# Patient Record
Sex: FEMALE | Race: WHITE | Employment: FULL TIME | URBAN - METROPOLITAN AREA
[De-identification: names, ages, dates, MRNs, and addresses within clinical notes are randomized per-mention and may not be internally consistent; named-entity substitution may affect disease eponyms.]

---

## 2020-06-02 ENCOUNTER — HOSPITAL ENCOUNTER (INPATIENT)
Age: 45
LOS: 2 days | Discharge: HOME | DRG: 272 | End: 2020-06-04
Attending: GENERAL PRACTICE | Admitting: GENERAL PRACTICE
Payer: COMMERCIAL

## 2020-06-02 PROBLEM — I82.402 ACUTE DEEP VEIN THROMBOSIS (DVT) OF LEFT LOWER EXTREMITY (HCC): Status: ACTIVE | Noted: 2020-06-02

## 2020-06-02 LAB
APTT: 25.1 SECONDS (ref 25.1–37.1)
HCT VFR BLD CALC: 37.1 % (ref 37–47)
HEMOGLOBIN: 11.4 GM/DL (ref 12.5–16)
HOMOCYSTEINE: 9.6 UMOL/L (ref 0–10)
MCH RBC QN AUTO: 28.4 PG (ref 27–31)
MCHC RBC AUTO-ENTMCNC: 30.7 % (ref 32–36)
MCV RBC AUTO: 92.3 FL (ref 78–100)
PDW BLD-RTO: 15.5 % (ref 11.7–14.9)
PLATELET # BLD: 431 K/CU MM (ref 140–440)
PMV BLD AUTO: 8.4 FL (ref 7.5–11.1)
RBC # BLD: 4.02 M/CU MM (ref 4.2–5.4)
WBC # BLD: 9.9 K/CU MM (ref 4–10.5)

## 2020-06-02 PROCEDURE — 81240 F2 GENE: CPT

## 2020-06-02 PROCEDURE — 85240 CLOT FACTOR VIII AHG 1 STAGE: CPT

## 2020-06-02 PROCEDURE — 85300 ANTITHROMBIN III ACTIVITY: CPT

## 2020-06-02 PROCEDURE — 81291 MTHFR GENE: CPT

## 2020-06-02 PROCEDURE — 86147 CARDIOLIPIN ANTIBODY EA IG: CPT

## 2020-06-02 PROCEDURE — 86148 ANTI-PHOSPHOLIPID ANTIBODY: CPT

## 2020-06-02 PROCEDURE — 85303 CLOT INHIBIT PROT C ACTIVITY: CPT

## 2020-06-02 PROCEDURE — 85027 COMPLETE CBC AUTOMATED: CPT

## 2020-06-02 PROCEDURE — 85730 THROMBOPLASTIN TIME PARTIAL: CPT

## 2020-06-02 PROCEDURE — 6360000002 HC RX W HCPCS: Performed by: GENERAL PRACTICE

## 2020-06-02 PROCEDURE — 1200000000 HC SEMI PRIVATE

## 2020-06-02 PROCEDURE — 83090 ASSAY OF HOMOCYSTEINE: CPT

## 2020-06-02 PROCEDURE — 81241 F5 GENE: CPT

## 2020-06-02 PROCEDURE — 36415 COLL VENOUS BLD VENIPUNCTURE: CPT

## 2020-06-02 PROCEDURE — 2580000003 HC RX 258: Performed by: GENERAL PRACTICE

## 2020-06-02 PROCEDURE — 85305 CLOT INHIBIT PROT S TOTAL: CPT

## 2020-06-02 RX ORDER — LORATADINE 10 MG/1
10 CAPSULE, LIQUID FILLED ORAL DAILY
COMMUNITY

## 2020-06-02 RX ORDER — HEPARIN SODIUM 1000 [USP'U]/ML
10000 INJECTION, SOLUTION INTRAVENOUS; SUBCUTANEOUS PRN
Status: DISCONTINUED | OUTPATIENT
Start: 2020-06-03 | End: 2020-06-03

## 2020-06-02 RX ORDER — LEVOTHYROXINE SODIUM 0.1 MG/1
200 TABLET ORAL DAILY
COMMUNITY

## 2020-06-02 RX ORDER — SODIUM CHLORIDE 0.9 % (FLUSH) 0.9 %
10 SYRINGE (ML) INJECTION EVERY 12 HOURS SCHEDULED
Status: DISCONTINUED | OUTPATIENT
Start: 2020-06-02 | End: 2020-06-04 | Stop reason: HOSPADM

## 2020-06-02 RX ORDER — HEPARIN SODIUM 1000 [USP'U]/ML
10000 INJECTION, SOLUTION INTRAVENOUS; SUBCUTANEOUS ONCE
Status: COMPLETED | OUTPATIENT
Start: 2020-06-02 | End: 2020-06-02

## 2020-06-02 RX ORDER — PROMETHAZINE HYDROCHLORIDE 25 MG/1
12.5 TABLET ORAL EVERY 6 HOURS PRN
Status: DISCONTINUED | OUTPATIENT
Start: 2020-06-02 | End: 2020-06-04 | Stop reason: HOSPADM

## 2020-06-02 RX ORDER — HEPARIN SODIUM 10000 [USP'U]/100ML
16.5 INJECTION, SOLUTION INTRAVENOUS CONTINUOUS
Status: DISCONTINUED | OUTPATIENT
Start: 2020-06-02 | End: 2020-06-02

## 2020-06-02 RX ORDER — CHOLECALCIFEROL (VITAMIN D3) 125 MCG
500 CAPSULE ORAL EVERY OTHER DAY
COMMUNITY

## 2020-06-02 RX ORDER — SODIUM CHLORIDE 0.9 % (FLUSH) 0.9 %
10 SYRINGE (ML) INJECTION PRN
Status: DISCONTINUED | OUTPATIENT
Start: 2020-06-02 | End: 2020-06-04 | Stop reason: HOSPADM

## 2020-06-02 RX ORDER — POLYETHYLENE GLYCOL 3350 17 G/17G
17 POWDER, FOR SOLUTION ORAL DAILY PRN
Status: DISCONTINUED | OUTPATIENT
Start: 2020-06-02 | End: 2020-06-04 | Stop reason: HOSPADM

## 2020-06-02 RX ORDER — HEPARIN SODIUM 10000 [USP'U]/100ML
18 INJECTION, SOLUTION INTRAVENOUS CONTINUOUS
Status: DISCONTINUED | OUTPATIENT
Start: 2020-06-02 | End: 2020-06-03

## 2020-06-02 RX ORDER — ONDANSETRON 2 MG/ML
4 INJECTION INTRAMUSCULAR; INTRAVENOUS EVERY 6 HOURS PRN
Status: DISCONTINUED | OUTPATIENT
Start: 2020-06-02 | End: 2020-06-04 | Stop reason: HOSPADM

## 2020-06-02 RX ORDER — ACETAMINOPHEN 325 MG/1
650 TABLET ORAL EVERY 6 HOURS PRN
Status: DISCONTINUED | OUTPATIENT
Start: 2020-06-02 | End: 2020-06-04 | Stop reason: HOSPADM

## 2020-06-02 RX ORDER — ACETAMINOPHEN 650 MG/1
650 SUPPOSITORY RECTAL EVERY 6 HOURS PRN
Status: DISCONTINUED | OUTPATIENT
Start: 2020-06-02 | End: 2020-06-04 | Stop reason: HOSPADM

## 2020-06-02 RX ORDER — NICOTINE POLACRILEX 2 MG
1 GUM BUCCAL DAILY
COMMUNITY

## 2020-06-02 RX ORDER — HEPARIN SODIUM 1000 [USP'U]/ML
5000 INJECTION, SOLUTION INTRAVENOUS; SUBCUTANEOUS PRN
Status: DISCONTINUED | OUTPATIENT
Start: 2020-06-02 | End: 2020-06-03

## 2020-06-02 RX ORDER — LEVOTHYROXINE SODIUM 0.1 MG/1
200 TABLET ORAL DAILY
Status: DISCONTINUED | OUTPATIENT
Start: 2020-06-03 | End: 2020-06-04 | Stop reason: HOSPADM

## 2020-06-02 RX ADMIN — HEPARIN SODIUM AND DEXTROSE 18 UNITS/KG/HR: 10000; 5 INJECTION INTRAVENOUS at 20:08

## 2020-06-02 RX ADMIN — SODIUM CHLORIDE, PRESERVATIVE FREE 10 ML: 5 INJECTION INTRAVENOUS at 20:09

## 2020-06-02 RX ADMIN — HEPARIN SODIUM 10000 UNITS: 1000 INJECTION INTRAVENOUS; SUBCUTANEOUS at 20:09

## 2020-06-02 ASSESSMENT — PAIN SCALES - GENERAL
PAINLEVEL_OUTOF10: 0
PAINLEVEL_OUTOF10: 0

## 2020-06-03 ENCOUNTER — APPOINTMENT (OUTPATIENT)
Dept: INTERVENTIONAL RADIOLOGY/VASCULAR | Age: 45
DRG: 272 | End: 2020-06-03
Attending: GENERAL PRACTICE
Payer: COMMERCIAL

## 2020-06-03 LAB
ALBUMIN SERPL-MCNC: 3.2 GM/DL (ref 3.4–5)
ALP BLD-CCNC: 116 IU/L (ref 40–128)
ALT SERPL-CCNC: 13 U/L (ref 10–40)
ANION GAP SERPL CALCULATED.3IONS-SCNC: 11 MMOL/L (ref 4–16)
APTT: 132.5 SECONDS (ref 25.1–37.1)
APTT: 29.2 SECONDS (ref 25.1–37.1)
APTT: 29.4 SECONDS (ref 25.1–37.1)
APTT: 58.5 SECONDS (ref 25.1–37.1)
APTT: 66.4 SECONDS (ref 25.1–37.1)
AST SERPL-CCNC: 18 IU/L (ref 15–37)
BILIRUB SERPL-MCNC: 0.2 MG/DL (ref 0–1)
BUN BLDV-MCNC: 11 MG/DL (ref 6–23)
CALCIUM SERPL-MCNC: 8.3 MG/DL (ref 8.3–10.6)
CHLORIDE BLD-SCNC: 107 MMOL/L (ref 99–110)
CO2: 21 MMOL/L (ref 21–32)
CREAT SERPL-MCNC: 0.8 MG/DL (ref 0.6–1.1)
D DIMER: 740 NG/ML(DDU)
GFR AFRICAN AMERICAN: >60 ML/MIN/1.73M2
GFR NON-AFRICAN AMERICAN: >60 ML/MIN/1.73M2
GLUCOSE BLD-MCNC: 119 MG/DL (ref 70–99)
HCT VFR BLD CALC: 36.5 % (ref 37–47)
HEMOGLOBIN: 11.1 GM/DL (ref 12.5–16)
INR BLD: 0.98 INDEX
MCH RBC QN AUTO: 28.2 PG (ref 27–31)
MCHC RBC AUTO-ENTMCNC: 30.4 % (ref 32–36)
MCV RBC AUTO: 92.9 FL (ref 78–100)
PDW BLD-RTO: 15.8 % (ref 11.7–14.9)
PLATELET # BLD: 461 K/CU MM (ref 140–440)
PMV BLD AUTO: 8.3 FL (ref 7.5–11.1)
POTASSIUM SERPL-SCNC: 3.8 MMOL/L (ref 3.5–5.1)
PROTHROMBIN TIME: 11.9 SECONDS (ref 11.7–14.5)
RBC # BLD: 3.93 M/CU MM (ref 4.2–5.4)
SODIUM BLD-SCNC: 139 MMOL/L (ref 135–145)
TOTAL PROTEIN: 6.1 GM/DL (ref 6.4–8.2)
WBC # BLD: 8.3 K/CU MM (ref 4–10.5)

## 2020-06-03 PROCEDURE — 75825 VEIN X-RAY TRUNK: CPT

## 2020-06-03 PROCEDURE — 36415 COLL VENOUS BLD VENIPUNCTURE: CPT

## 2020-06-03 PROCEDURE — C1769 GUIDE WIRE: HCPCS

## 2020-06-03 PROCEDURE — C1725 CATH, TRANSLUMIN NON-LASER: HCPCS

## 2020-06-03 PROCEDURE — C1876 STENT, NON-COA/NON-COV W/DEL: HCPCS

## 2020-06-03 PROCEDURE — 85027 COMPLETE CBC AUTOMATED: CPT

## 2020-06-03 PROCEDURE — 75820 VEIN X-RAY ARM/LEG: CPT

## 2020-06-03 PROCEDURE — 80053 COMPREHEN METABOLIC PANEL: CPT

## 2020-06-03 PROCEDURE — 85379 FIBRIN DEGRADATION QUANT: CPT

## 2020-06-03 PROCEDURE — 76499 UNLISTED DX RADIOGRAPHIC PX: CPT

## 2020-06-03 PROCEDURE — 2709999900 HC NON-CHARGEABLE SUPPLY

## 2020-06-03 PROCEDURE — 37249 TRLUML BALO ANGIOP ADDL VEIN: CPT

## 2020-06-03 PROCEDURE — C1894 INTRO/SHEATH, NON-LASER: HCPCS

## 2020-06-03 PROCEDURE — 6370000000 HC RX 637 (ALT 250 FOR IP): Performed by: PHYSICIAN ASSISTANT

## 2020-06-03 PROCEDURE — 6370000000 HC RX 637 (ALT 250 FOR IP): Performed by: GENERAL PRACTICE

## 2020-06-03 PROCEDURE — 6360000004 HC RX CONTRAST MEDICATION: Performed by: GENERAL PRACTICE

## 2020-06-03 PROCEDURE — 36010 PLACE CATHETER IN VEIN: CPT

## 2020-06-03 PROCEDURE — 1200000000 HC SEMI PRIVATE

## 2020-06-03 PROCEDURE — 6360000002 HC RX W HCPCS: Performed by: RADIOLOGY

## 2020-06-03 PROCEDURE — 94761 N-INVAS EAR/PLS OXIMETRY MLT: CPT

## 2020-06-03 PROCEDURE — 06CN3ZZ EXTIRPATION OF MATTER FROM LEFT FEMORAL VEIN, PERCUTANEOUS APPROACH: ICD-10-PCS | Performed by: RADIOLOGY

## 2020-06-03 PROCEDURE — 37238 OPEN/PERQ PLACE STENT SAME: CPT

## 2020-06-03 PROCEDURE — 06CD3ZZ EXTIRPATION OF MATTER FROM LEFT COMMON ILIAC VEIN, PERCUTANEOUS APPROACH: ICD-10-PCS | Performed by: RADIOLOGY

## 2020-06-03 PROCEDURE — 85730 THROMBOPLASTIN TIME PARTIAL: CPT

## 2020-06-03 PROCEDURE — 06CG3ZZ EXTIRPATION OF MATTER FROM LEFT EXTERNAL ILIAC VEIN, PERCUTANEOUS APPROACH: ICD-10-PCS | Performed by: RADIOLOGY

## 2020-06-03 PROCEDURE — 067G3DZ DILATION OF LEFT EXTERNAL ILIAC VEIN WITH INTRALUMINAL DEVICE, PERCUTANEOUS APPROACH: ICD-10-PCS | Performed by: RADIOLOGY

## 2020-06-03 PROCEDURE — 6360000002 HC RX W HCPCS: Performed by: GENERAL PRACTICE

## 2020-06-03 PROCEDURE — 37248 TRLUML BALO ANGIOP 1ST VEIN: CPT

## 2020-06-03 PROCEDURE — 99254 IP/OBS CNSLTJ NEW/EST MOD 60: CPT | Performed by: INTERNAL MEDICINE

## 2020-06-03 PROCEDURE — 2580000003 HC RX 258: Performed by: GENERAL PRACTICE

## 2020-06-03 PROCEDURE — 85610 PROTHROMBIN TIME: CPT

## 2020-06-03 RX ORDER — HYDROCODONE BITARTRATE AND ACETAMINOPHEN 5; 325 MG/1; MG/1
1 TABLET ORAL EVERY 6 HOURS PRN
Status: DISCONTINUED | OUTPATIENT
Start: 2020-06-03 | End: 2020-06-04 | Stop reason: HOSPADM

## 2020-06-03 RX ORDER — MIDAZOLAM HYDROCHLORIDE 1 MG/ML
2 INJECTION INTRAMUSCULAR; INTRAVENOUS ONCE
Status: COMPLETED | OUTPATIENT
Start: 2020-06-03 | End: 2020-06-03

## 2020-06-03 RX ORDER — FENTANYL CITRATE 50 UG/ML
50 INJECTION, SOLUTION INTRAMUSCULAR; INTRAVENOUS ONCE
Status: COMPLETED | OUTPATIENT
Start: 2020-06-03 | End: 2020-06-03

## 2020-06-03 RX ADMIN — APIXABAN 10 MG: 5 TABLET, FILM COATED ORAL at 15:06

## 2020-06-03 RX ADMIN — FENTANYL CITRATE 50 MCG: 50 INJECTION INTRAMUSCULAR; INTRAVENOUS at 11:14

## 2020-06-03 RX ADMIN — HEPARIN SODIUM AND DEXTROSE 14.98 UNITS/KG/HR: 10000; 5 INJECTION INTRAVENOUS at 06:43

## 2020-06-03 RX ADMIN — HYDROCODONE BITARTRATE AND ACETAMINOPHEN 1 TABLET: 5; 325 TABLET ORAL at 21:45

## 2020-06-03 RX ADMIN — SODIUM CHLORIDE, PRESERVATIVE FREE 10 ML: 5 INJECTION INTRAVENOUS at 20:37

## 2020-06-03 RX ADMIN — APIXABAN 10 MG: 5 TABLET, FILM COATED ORAL at 20:37

## 2020-06-03 RX ADMIN — IOPAMIDOL 37 ML: 755 INJECTION, SOLUTION INTRAVENOUS at 11:37

## 2020-06-03 RX ADMIN — HYDROCODONE BITARTRATE AND ACETAMINOPHEN 1 TABLET: 5; 325 TABLET ORAL at 15:06

## 2020-06-03 RX ADMIN — MIDAZOLAM 2 MG: 1 INJECTION INTRAMUSCULAR; INTRAVENOUS at 11:14

## 2020-06-03 ASSESSMENT — PAIN DESCRIPTION - LOCATION: LOCATION: BACK

## 2020-06-03 ASSESSMENT — PAIN SCALES - GENERAL
PAINLEVEL_OUTOF10: 8
PAINLEVEL_OUTOF10: 0
PAINLEVEL_OUTOF10: 3
PAINLEVEL_OUTOF10: 0

## 2020-06-03 ASSESSMENT — PAIN DESCRIPTION - PAIN TYPE: TYPE: ACUTE PAIN

## 2020-06-03 NOTE — CONSULTS
allergies. Social History:    TOBACCO:   reports that she has quit smoking. She has never used smokeless tobacco.  ETOH:   has no history on file for alcohol. DRUGS:   has no history on file for drug. Family History:   As above  REVIEW OF SYSTEMS:    No NVD. No fever or chills. No chest pain or SOB. LLE pain and swelling. The remainder of ROS is unremarkable.     PHYSICAL EXAM:      Vitals:    BP (!) 107/52   Pulse 63   Temp 97.9 °F (36.6 °C) (Oral)   Resp 16   Ht 5' 9\" (1.753 m)   Wt 267 lb 6.4 oz (121.3 kg)   SpO2 97%   BMI 39.49 kg/m²     CONSTITUTIONAL:  awake, alert, cooperative and no apparent distress  EYES:  extra-ocular muscles intact and sclera clear  NECK:  supple, symmetrical, trachea midline and no lymphadenopathy  LUNGS:  clear to auscultation, no crackles or wheezing  CARDIOVASCULAR:  normal S1 and S2 and no murmur noted  ABDOMEN:  normal bowel sounds, soft, non-distended and non-tender  MUSCULOSKELETAL:  LLE swelling  NEUROLOGIC:  Cranial Nerves:  cranial nerves II-XII are grossly intact  SKIN:  no rashes and no jaundice  DATA:    Labs:  General Labs:    CBC with Differential:    Lab Results   Component Value Date    WBC 8.3 06/03/2020    RBC 3.93 06/03/2020    HGB 11.1 06/03/2020    HCT 36.5 06/03/2020     06/03/2020    MCV 92.9 06/03/2020    MCH 28.2 06/03/2020    MCHC 30.4 06/03/2020    RDW 15.8 06/03/2020    SEGSPCT 63.7 05/24/2011    LYMPHOPCT 29.9 05/24/2011    MONOPCT 4.1 05/24/2011    EOSPCT 2.0 05/24/2011    BASOPCT 0.3 05/24/2011    MONOSABS 0.4 05/24/2011    LYMPHSABS 2.7 05/24/2011    EOSABS 0.2 05/24/2011    BASOSABS 0.0 05/24/2011    DIFFTYPE AUTOMATED DIFFERENTIAL 05/24/2011     CMP:    Lab Results   Component Value Date     06/03/2020    K 3.8 06/03/2020     06/03/2020    CO2 21 06/03/2020    BUN 11 06/03/2020    CREATININE 0.8 06/03/2020    GFRAA >60 06/03/2020    LABGLOM >60 06/03/2020    GLUCOSE 119 06/03/2020    PROT 6.1 06/03/2020    PROT 7.4 05/24/2011    LABALBU 3.2 06/03/2020    CALCIUM 8.3 06/03/2020    BILITOT 0.2 06/03/2020    ALKPHOS 116 06/03/2020    AST 18 06/03/2020    ALT 13 06/03/2020       IMPRESSION/RECOMMENDATIONS:      1. She has provoked LLE DVT related to birth control pill. I recommend to stop birth control pill. She is on heparin drip. I recommend to keep her on AC up to 3 months. 2. I recommend to keep age appropriate cancer screening up-to-date. She had mammogram in October 2019. She had colonoscopy in 2009 due to ? Diarrhea. Thanks for allowing me to participate in her care. Will follow up.

## 2020-06-03 NOTE — H&P
History & Physical        Reason for admission: DVT LLE    History Obtained From:  patient    HISTORY OF PRESENT ILLNESS:    The patient is a 53 y.o. female who presented yesterday with extensive DVT in her left lower extremity. She has history of hypothyroidism and obesity. Patient reports her left leg started swelling about 3 to 4 weeks ago. She was experiencing some achy pain throughout her entire leg, but this resolved. She was at her PCP for routine office visit and PCP noticed swelling of the left leg. He sent her to get ultrasound and this was positive for extensive DVTs. She was directly admitted to the hospital.  Patient reports she started oral birth control pills about 2 months ago. She has been on Depo shots in the past.  Furthermore she has been working from home with pandemic and reports being much less active and sitting all day. She has no personal or family history of PE or DVT. She denies recent travel history. Now she has no achy pain in the leg. Denies chest pain, shortness of breath, dizziness, palpitations, abdominal pain, vomiting, diarrhea. She does report minor cough. CBC and CMP are unremarkable. She has been put on heparin last night and her leg swelling has improved some.     Past Medical History:        Diagnosis Date    Former smoker, stopped smoking in distant past     Gastric bypass status for obesity     Hx of blood clots     Hypothyroidism     Pancreatitis        Past Surgical History:        Procedure Laterality Date    ABDOMEN SURGERY      BICEPS TENDON REPAIR      CHOLECYSTECTOMY      DILATATION, ESOPHAGUS         Medications Prior to Admission:    Medications Prior to Admission: levothyroxine (SYNTHROID) 100 MCG tablet, Take 200 mcg by mouth Daily  loratadine (CLARITIN) 10 MG capsule, Take 10 mg by mouth daily  vitamin B-12 (CYANOCOBALAMIN) 500 MCG tablet, Take 500 mcg by mouth every other day  Glucosamine-Chondroit-Vit C-Mn (GLUCOSAMINE 1500 COMPLEX PO), agree with assessment and plan. I discussed with patient about the treatment options as well as etiology of her disease. Heparin was started initially. I also discussed with interventional radiology group.  We will monitor condition closely

## 2020-06-04 ENCOUNTER — APPOINTMENT (OUTPATIENT)
Dept: CT IMAGING | Age: 45
DRG: 272 | End: 2020-06-04
Attending: GENERAL PRACTICE
Payer: COMMERCIAL

## 2020-06-04 VITALS
RESPIRATION RATE: 22 BRPM | WEIGHT: 263.7 LBS | HEIGHT: 69 IN | DIASTOLIC BLOOD PRESSURE: 57 MMHG | HEART RATE: 93 BPM | TEMPERATURE: 98.3 F | OXYGEN SATURATION: 100 % | SYSTOLIC BLOOD PRESSURE: 98 MMHG | BODY MASS INDEX: 39.06 KG/M2

## 2020-06-04 LAB
ALBUMIN SERPL-MCNC: 3.4 GM/DL (ref 3.4–5)
ALP BLD-CCNC: 131 IU/L (ref 40–128)
ALT SERPL-CCNC: 13 U/L (ref 10–40)
ANION GAP SERPL CALCULATED.3IONS-SCNC: 11 MMOL/L (ref 4–16)
APTT: 28.5 SECONDS (ref 25.1–37.1)
APTT: 30.4 SECONDS (ref 25.1–37.1)
AST SERPL-CCNC: 18 IU/L (ref 15–37)
BILIRUB SERPL-MCNC: 0.3 MG/DL (ref 0–1)
BUN BLDV-MCNC: 10 MG/DL (ref 6–23)
CALCIUM SERPL-MCNC: 9 MG/DL (ref 8.3–10.6)
CHLORIDE BLD-SCNC: 104 MMOL/L (ref 99–110)
CO2: 25 MMOL/L (ref 21–32)
CREAT SERPL-MCNC: 0.9 MG/DL (ref 0.6–1.1)
GFR AFRICAN AMERICAN: >60 ML/MIN/1.73M2
GFR NON-AFRICAN AMERICAN: >60 ML/MIN/1.73M2
GLUCOSE BLD-MCNC: 90 MG/DL (ref 70–99)
HCT VFR BLD CALC: 35.8 % (ref 37–47)
HEMOGLOBIN: 11 GM/DL (ref 12.5–16)
MCH RBC QN AUTO: 28.1 PG (ref 27–31)
MCHC RBC AUTO-ENTMCNC: 30.7 % (ref 32–36)
MCV RBC AUTO: 91.6 FL (ref 78–100)
PDW BLD-RTO: 15.6 % (ref 11.7–14.9)
PLATELET # BLD: 387 K/CU MM (ref 140–440)
PMV BLD AUTO: 8.5 FL (ref 7.5–11.1)
POTASSIUM SERPL-SCNC: 4.7 MMOL/L (ref 3.5–5.1)
RBC # BLD: 3.91 M/CU MM (ref 4.2–5.4)
SODIUM BLD-SCNC: 140 MMOL/L (ref 135–145)
TOTAL PROTEIN: 6.4 GM/DL (ref 6.4–8.2)
WBC # BLD: 7.2 K/CU MM (ref 4–10.5)

## 2020-06-04 PROCEDURE — 99231 SBSQ HOSP IP/OBS SF/LOW 25: CPT | Performed by: NURSE PRACTITIONER

## 2020-06-04 PROCEDURE — 2580000003 HC RX 258: Performed by: GENERAL PRACTICE

## 2020-06-04 PROCEDURE — 85730 THROMBOPLASTIN TIME PARTIAL: CPT

## 2020-06-04 PROCEDURE — 94761 N-INVAS EAR/PLS OXIMETRY MLT: CPT

## 2020-06-04 PROCEDURE — 6370000000 HC RX 637 (ALT 250 FOR IP): Performed by: GENERAL PRACTICE

## 2020-06-04 PROCEDURE — 6370000000 HC RX 637 (ALT 250 FOR IP): Performed by: PHYSICIAN ASSISTANT

## 2020-06-04 PROCEDURE — 6360000004 HC RX CONTRAST MEDICATION: Performed by: INTERNAL MEDICINE

## 2020-06-04 PROCEDURE — 74177 CT ABD & PELVIS W/CONTRAST: CPT

## 2020-06-04 PROCEDURE — 36415 COLL VENOUS BLD VENIPUNCTURE: CPT

## 2020-06-04 PROCEDURE — 85049 AUTOMATED PLATELET COUNT: CPT

## 2020-06-04 PROCEDURE — 85027 COMPLETE CBC AUTOMATED: CPT

## 2020-06-04 PROCEDURE — 80053 COMPREHEN METABOLIC PANEL: CPT

## 2020-06-04 RX ADMIN — LEVOTHYROXINE SODIUM 200 MCG: 100 TABLET ORAL at 06:14

## 2020-06-04 RX ADMIN — APIXABAN 10 MG: 5 TABLET, FILM COATED ORAL at 08:12

## 2020-06-04 RX ADMIN — IOPAMIDOL 80 ML: 755 INJECTION, SOLUTION INTRAVENOUS at 11:00

## 2020-06-04 RX ADMIN — SODIUM CHLORIDE, PRESERVATIVE FREE 10 ML: 5 INJECTION INTRAVENOUS at 08:13

## 2020-06-04 ASSESSMENT — PAIN SCALES - GENERAL: PAINLEVEL_OUTOF10: 0

## 2020-06-04 NOTE — DISCHARGE SUMMARY
follow up with PCP, IR, hematology as outpatient. Relevant Investigations    CT ABDOMEN W/IV CONT (6/4/20)  No acute intra-abdominal or intrapelvic process.  Left iliac venous stent noted without evidence of retroperitoneal hematoma or extrinsic mass. Juan Francisco Cure is no contrast to confirm stent patency. 2. Postsurgical changes noted related to gastric bypass with cholecystectomy. IR VENOGRAM LEFT LOWER EXT (6/3/20)  1. Technically successful mechanical thrombectomy of a left lower extremity DVT with restoration of antegrade flow, as described above. 2. Successful left iliac vein angioplasty and stent placement, as described above. 3. Patient to be sent home on Coumadin anticoagulation Plavix for 6 months      Disposition: home    Patient Instructions:    Zuleyma Mercado   Home Medication Instructions [id number]    Printed on:06/04/20 1123   Medication Information                      apixaban (ELIQUIS) 5 MG TABS tablet  Take 2 tablets by mouth 2 times daily for 1 week after procedure,   Then take 1 tablet twice daily each day thereafter. Biotin 1 MG CAPS  Take 1 tablet by mouth daily             Glucosamine-Chondroit-Vit C-Mn (GLUCOSAMINE 1500 COMPLEX PO)  Take 1 tablet by mouth daily             levothyroxine (SYNTHROID) 100 MCG tablet  Take 200 mcg by mouth Daily             loratadine (CLARITIN) 10 MG capsule  Take 10 mg by mouth daily             vitamin B-12 (CYANOCOBALAMIN) 500 MCG tablet  Take 500 mcg by mouth every other day                    Activity: activity as tolerated  Diet: regular diet    Follow-up with Dr. Gisele Garrison in 5 days    Signed: Electronically signed by Mac Teresa PA-C on 6/4/2020 at 11:12 AM   I have independently evaluated and examined this patient today. I have reviewed radiologic and biochemical tests on this patient. Management Plan is developed mutually with IZABEL Ortiz. I have reviewed above note and agree with assessment and plan.

## 2020-06-04 NOTE — PROGRESS NOTES
HEMATOLOGY & ONCOLOGY progress note  [facility]      Patient was seen and examined today. Not in any acute distress and no overnight events. He is getting better today. No new complaints. Wing Gault REVIEW OF SYSTEMS    Denies chest pain, shortness of breath, no palpitations, no easy bleeding or bruising. PHYSICAL EXAM    Vitals: BP (!) 98/57   Pulse 79   Temp 98.3 °F (36.8 °C) (Oral)   Resp 22   Ht 5' 9\" (1.753 m)   Wt 263 lb 11.2 oz (119.6 kg)   SpO2 100%   BMI 38.94 kg/m²   General appearance: alert, appears stated age and cooperative. Skin: Skin color, texture, turgor normal. No rashes or lesions  Neck: no adenopathy, no carotid bruit, no JVD, supple, symmetrical, trachea midline and thyroid not enlarged, symmetric, no tenderness/mass/nodules  Lungs: clear to auscultation bilaterally  Heart: regular rate and rhythm, S1, S2 normal, no murmur, click, rub or gallop  Abdomen: soft, non-tender; bowel sounds normal; no masses,  no organomegaly  Extremities: extremities normal, atraumatic, no cyanosis or edema  Neurologic: Mental status: Alert, oriented, thought content appropriate    LABORATORY RESULTS  CBC:   Recent Labs     06/02/20  1821 06/03/20  0715 06/04/20  0602   WBC 9.9 8.3 7.2   HGB 11.4* 11.1* 11.0*    461* 387     BMP:    Recent Labs     06/03/20  0157 06/04/20  0602    140   K 3.8 4.7    104   CO2 21 25   BUN 11 10   CREATININE 0.8 0.9   GLUCOSE 119* 90     Hepatic:   Recent Labs     06/03/20  0157 06/04/20  0602   AST 18 18   ALT 13 13   BILITOT 0.2 0.3   ALKPHOS 116 131*     INR:   Recent Labs     06/03/20  0715   INR 0.98       RADIOLOGY REPORTS  CT scan of the chest  CT scan of the abdomen & pelvis  CT scan of the head  Bone scan    ASSESSMENT/RECOMMENDATION    1. She has provoked LLE DVT related to birth control pill. I recommend to stop birth control pill. She started eliquis. I recommend to keep her on AC up to 3 months.     2.

## 2020-06-05 LAB
ANTICARDIOLIPIN IGA ANTIBODY: 1 APL (ref 0–11)
ANTICARDIOLIPIN IGG ANTIBODY: 6 GPL (ref 0–14)
ANTITHROMBIN ACTIVITY: 105 % (ref 76–128)
CARDIOLIPIN AB IGM: 8 MPL (ref 0–12)
FACTOR VIII ACTIVITY: 163 % (ref 56–191)

## 2020-06-06 LAB
PHOSPHATIDYLSERINE IGA ANTIBODY: 2 U/ML (ref 0–19)
PHOSPHATIDYLSERINE IGG ANTIBODY: 8 U/ML (ref 0–10)
PHOSPHATIDYLSERINE IGM ANTIBODY: 16 U/ML (ref 0–24)
PROTEIN C ACTIVITY: 129 % (ref 83–168)
PROTEIN S ACTIVITY: 100 % (ref 57–131)

## 2020-06-07 LAB — FACTOR V LEIDEN: NEGATIVE

## 2020-06-08 LAB — PROTHROMBIN G20210A MUTATION: NEGATIVE

## 2020-06-09 LAB
MTHFR BY PCR SPECIMEN: NORMAL
MTHFR INTERPRETATION: NORMAL
MTHFR MUTATION A1298C: NORMAL
MTHFR MUTATION C677T: NEGATIVE

## 2025-07-16 RX ORDER — BUPROPION HYDROCHLORIDE 100 MG/1
100 TABLET, EXTENDED RELEASE ORAL 2 TIMES DAILY
COMMUNITY